# Patient Record
Sex: MALE | URBAN - METROPOLITAN AREA
[De-identification: names, ages, dates, MRNs, and addresses within clinical notes are randomized per-mention and may not be internally consistent; named-entity substitution may affect disease eponyms.]

---

## 2019-03-12 ENCOUNTER — HOSPITAL ENCOUNTER (OUTPATIENT)
Dept: INFUSION THERAPY | Age: 44
Discharge: HOME | End: 2019-03-12
Payer: COMMERCIAL

## 2019-03-12 VITALS
TEMPERATURE: 96.3 F | HEART RATE: 75 BPM | DIASTOLIC BLOOD PRESSURE: 84 MMHG | OXYGEN SATURATION: 95 % | RESPIRATION RATE: 16 BRPM | SYSTOLIC BLOOD PRESSURE: 144 MMHG

## 2019-03-12 LAB
CORTIS 1H P CHAL SERPL-MCNC: 28.7 UG/DL
CORTIS 30M P CHAL SERPL-MCNC: 22.7 UG/DL
CORTIS BS SERPL-MCNC: 6.4 UG/DL

## 2019-03-12 PROCEDURE — 82024 ASSAY OF ACTH: CPT

## 2019-03-12 PROCEDURE — 74011000250 HC RX REV CODE- 250: Performed by: INTERNAL MEDICINE

## 2019-03-12 PROCEDURE — 96374 THER/PROPH/DIAG INJ IV PUSH: CPT

## 2019-03-12 PROCEDURE — 74011250636 HC RX REV CODE- 250/636: Performed by: INTERNAL MEDICINE

## 2019-03-12 PROCEDURE — 82533 TOTAL CORTISOL: CPT

## 2019-03-12 PROCEDURE — 36415 COLL VENOUS BLD VENIPUNCTURE: CPT

## 2019-03-12 RX ORDER — LISINOPRIL 20 MG/1
20 TABLET ORAL DAILY
COMMUNITY

## 2019-03-12 RX ORDER — SODIUM CHLORIDE 0.9 % (FLUSH) 0.9 %
10 SYRINGE (ML) INJECTION AS NEEDED
Status: ACTIVE | OUTPATIENT
Start: 2019-03-12 | End: 2019-03-13

## 2019-03-12 RX ORDER — SERTRALINE HYDROCHLORIDE 50 MG/1
50 TABLET, FILM COATED ORAL DAILY
COMMUNITY
End: 2021-05-22

## 2019-03-12 RX ADMIN — COSYNTROPIN 0.25 MG: 0.25 INJECTION, POWDER, LYOPHILIZED, FOR SOLUTION INTRAMUSCULAR; INTRAVENOUS at 09:20

## 2019-03-12 RX ADMIN — Medication 10 ML: at 09:16

## 2019-03-12 NOTE — PROGRESS NOTES
[facility] VISIT NOTE    0900  Pt arrived at [facility] ambulatory and in no distress for Cortisol Stimulation Test.  Assessment completed, pt c/o fatigue, nausea and diarrhea. Blood pressure 123/84, pulse 80, temperature 96.3 °F (35.7 °C), resp. rate 16, SpO2 95 %. 24 G PIV placed in right arm.      0920 Cortrosyn IVP given. 7690 Baseline serum cortisol and ACTH levels drawn. 7317 Serum cortisol level drawn. 1025 Serum cortisol level drawn. Blood pressure 144/84, pulse 75, temperature 96.3 °F (35.7 °C), resp. rate 16, SpO2 95 %. Tolerated treatment well, no adverse reaction noted. PIV flushed and removed. 1030  D/C'd from [facility] ambulatory and in no distress. Labs will be resulted in CC.

## 2019-03-13 LAB — ACTH PLAS-MCNC: 16.8 PG/ML (ref 7.2–63.3)

## 2021-05-22 ENCOUNTER — HOSPITAL ENCOUNTER (EMERGENCY)
Age: 46
Discharge: HOME | End: 2021-05-22
Attending: EMERGENCY MEDICINE
Payer: COMMERCIAL

## 2021-05-22 VITALS
HEIGHT: 70 IN | HEART RATE: 85 BPM | OXYGEN SATURATION: 98 % | WEIGHT: 267.2 LBS | RESPIRATION RATE: 16 BRPM | SYSTOLIC BLOOD PRESSURE: 131 MMHG | DIASTOLIC BLOOD PRESSURE: 78 MMHG | BODY MASS INDEX: 38.25 KG/M2 | TEMPERATURE: 97.8 F

## 2021-05-22 DIAGNOSIS — S61.210A LACERATION OF RIGHT INDEX FINGER WITHOUT FOREIGN BODY WITHOUT DAMAGE TO NAIL, INITIAL ENCOUNTER: Primary | ICD-10-CM

## 2021-05-22 PROCEDURE — 99282 EMERGENCY DEPT VISIT SF MDM: CPT

## 2021-05-22 PROCEDURE — 75810000293 HC SIMP/SUPERF WND  RPR

## 2021-05-22 NOTE — ED TRIAGE NOTES
Pt. States he was whittling and cut right 2nd alberto approximately 1.5 cm lac. Bleeding  Stopped. Tetanus up to date.

## 2021-05-22 NOTE — ED PROVIDER NOTES
The patient is a 70-year-old male with a past medical history significant for hypertension who presents to the ED with a complaint of  right index finger injury and laceration sustained this evening. The patient is right-handed. He denies any further discomfort. His tetanus imitation is up-to-date. Past Medical History:   Diagnosis Date    Hypertension        Past Surgical History:   Procedure Laterality Date    HX APPENDECTOMY      HX HEENT      tonsils         History reviewed. No pertinent family history. Social History     Socioeconomic History    Marital status: UNKNOWN     Spouse name: Not on file    Number of children: Not on file    Years of education: Not on file    Highest education level: Not on file   Occupational History    Not on file   Tobacco Use    Smoking status: Former Smoker    Smokeless tobacco: Never Used   Substance and Sexual Activity    Alcohol use: Yes     Alcohol/week: 2.0 standard drinks     Types: 2 Cans of beer per week     Comment: once a week    Drug use: Never    Sexual activity: Not on file   Other Topics Concern    Not on file   Social History Narrative    Not on file     Social Determinants of Health     Financial Resource Strain:     Difficulty of Paying Living Expenses:    Food Insecurity:     Worried About Running Out of Food in the Last Year:     [address] in the Last Year:    Transportation Needs:     Lack of Transportation (Medical):      Lack of Transportation (Non-Medical):    Physical Activity:     Days of Exercise per Week:     Minutes of Exercise per Session:    Stress:     Feeling of Stress :    Social Connections:     Frequency of Communication with Friends and Family:     Frequency of Social Gatherings with Friends and Family:     Attends Holiness Services:     Active Member of Clubs or Organizations:     Attends Club or Organization Meetings:     Marital Status:    Intimate Partner Violence:     Fear of Current or Ex-Partner:     Emotionally Abused:     Physically Abused:     Sexually Abused: ALLERGIES: Patient has no known allergies. Review of Systems   All other systems reviewed and are negative. Vitals:    05/22/21 1913   BP: 131/78   Pulse: 85   Resp: 16   Temp: 97.8 °F (36.6 °C)   SpO2: 98%   Weight: 121.2 kg (267 lb 3.2 oz)   Height: 5' 10\" (1.778 m)            Physical Exam  Vitals and nursing note reviewed. Exam conducted with a chaperone present. CONSTITUTIONAL: Well-appearing; well-nourished; in no apparent distress  HEAD: Normocephalic; atraumatic  EYES: PERRL; EOM intact; conjunctiva and sclera are clear bilaterally. ENT: No rhinorrhea; normal pharynx with no tonsillar hypertrophy; mucous membranes pink/moist, no erythema, no exudate. NECK: Supple; non-tender; no cervical lymphadenopathy  CARD: Normal S1, S2; no murmurs, rubs, or gallops. Regular rate and rhythm. RESP: Normal respiratory effort; breath sounds clear and equal bilaterally; no wheezes, rhonchi, or rales. ABD: Normal bowel sounds; non-distended; non-tender; no palpable organomegaly, no masses, no bruits. Back Exam: Normal inspection; no vertebral point tenderness, no CVA tenderness. Normal range of motion. EXT: Normal ROM in all four extremities; non-tender to palpation; no swelling or deformity; distal pulses are normal, no edema. SKIN: Warm; dry; 1 cm superficial laceration on the dorsal aspect of the right index finger. NEURO:Alert and oriented x 3, coherent, RAO-XII grossly intact, sensory and motor are non-focal.      MDM  Number of Diagnoses or Management Options  Laceration of right index finger without foreign body without damage to nail, initial encounter  Diagnosis management comments: Assessment: Right index finger superficial laceration    Plan: Education, reassurance, symptomatic treatment/wound care and laceration repair/serial exam/ Monitor and Reevaluate.          Amount and/or Complexity of Data Reviewed  Clinical lab tests: ordered and reviewed  Tests in the radiology section of CPT®: ordered and reviewed  Tests in the medicine section of CPT®: reviewed and ordered  Discussion of test results with the performing providers: yes  Decide to obtain previous medical records or to obtain history from someone other than the patient: yes  Obtain history from someone other than the patient: yes  Review and summarize past medical records: yes  Discuss the patient with other providers: yes  Independent visualization of images, tracings, or specimens: yes           Wound Closure by Adhesive    Date/Time: 5/22/2021 7:30 PM  Performed by: Nikita Smiley MD  Authorized by: Nikita Smiley MD     Consent:     Consent obtained:  Verbal    Consent given by:  Patient    Risks discussed:  Infection and pain    Alternatives discussed:  No treatment  Anesthesia (see MAR for exact dosages): Anesthesia method:  None  Laceration details:     Location:  Finger    Finger location:  R index finger    Length (cm):  1    Laceration depth: Superficial.  Repair type:     Repair type:  Simple  Pre-procedure details:     Preparation:  Patient was prepped and draped in usual sterile fashion  Exploration:     Hemostasis achieved with:  Direct pressure    Wound exploration: wound explored through full range of motion and entire depth of wound probed and visualized      Contaminated: no    Treatment:     Area cleansed with:  Shur-Clens    Amount of cleaning:  Standard  Skin repair:     Repair method:  Steri-Strips and tissue adhesive    Number of Steri-Strips:  3  Approximation:     Approximation:  Close  Post-procedure details:     Dressing:  Bulky dressing    Patient tolerance of procedure: Tolerated well, no immediate complications        Progress Note:   Pt has been reexamined by Rikki Sánchez MD. Pt is feeling much better. Symptoms have improved. All available results have been reviewed with pt and any available family.  Pt understands sx, dx, and tx in ED. Care plan has been outlined and questions have been answered. Pt is ready to go home. Will send home on right index finger laceration instruction. Wound care education. . Outpatient referral with PCP 1 week for wound recheck and further treatment as needed. Written by Zac Clemente MD,7:29 PM    .   .

## 2021-05-23 NOTE — ED NOTES
Pt d/c'd home. No IV. Pt and wife given d/c instructions, verbalized understanding. Pt declined w/c and left ambulatory in stable condition.

## 2025-06-27 ENCOUNTER — TRANSCRIBE ORDERS (OUTPATIENT)
Facility: HOSPITAL | Age: 50
End: 2025-06-27

## 2025-06-27 DIAGNOSIS — R53.83 OTHER FATIGUE: ICD-10-CM

## 2025-06-27 DIAGNOSIS — R11.0 NAUSEA: Primary | ICD-10-CM

## 2025-06-27 DIAGNOSIS — R19.7 DIARRHEA, UNSPECIFIED TYPE: ICD-10-CM

## 2025-08-28 ENCOUNTER — HOSPITAL ENCOUNTER (OUTPATIENT)
Facility: HOSPITAL | Age: 50
Discharge: HOME | End: 2025-08-31
Payer: COMMERCIAL

## 2025-08-28 DIAGNOSIS — R53.83 OTHER FATIGUE: ICD-10-CM

## 2025-08-28 DIAGNOSIS — R11.0 NAUSEA: ICD-10-CM

## 2025-08-28 DIAGNOSIS — R19.7 DIARRHEA, UNSPECIFIED TYPE: ICD-10-CM

## 2025-08-28 PROCEDURE — 3430000000 HC RX DIAGNOSTIC RADIOPHARMACEUTICAL: Performed by: NURSE PRACTITIONER

## 2025-08-28 PROCEDURE — A9541 TC99M SULFUR COLLOID: HCPCS | Performed by: NURSE PRACTITIONER

## 2025-08-28 PROCEDURE — 78264 GASTRIC EMPTYING IMG STUDY: CPT

## 2025-08-28 RX ORDER — TECHNETIUM TC 99M SULFUR COLLOID 2 MG
1 KIT MISCELLANEOUS
Status: COMPLETED | OUTPATIENT
Start: 2025-08-28 | End: 2025-08-28

## 2025-08-28 RX ADMIN — TECHNETIUM TC 99M SULFUR COLLOID 1 MILLICURIE: KIT at 12:13
